# Patient Record
(demographics unavailable — no encounter records)

---

## 2024-12-10 NOTE — DISCUSSION/SUMMARY
[Reviewed Clinical Lab Test(s)] : Results of clinical tests were reviewed. [Reviewed Radiology Report(s)] : Radiology reports were reviewed. [Discuss Alternatives/Risks/Benefits w/Patient] : All alternatives, risks, and benefits were discussed with the patient/family and all questions were answered.  Patient expressed good understanding and appreciates the importance of follow up as recommended. [FreeTextEntry1] : ASC-H HRHPV+ with HPV 16/18/45 negative  []please see diagram with full explanation []bc her prior pap was also abnormal: will repeat pap only in 6 mos []HPV 9 valent recommended [] [facility] supplement [] TVUS bc could not locate [facility]

## 2024-12-10 NOTE — PHYSICAL EXAM
[Normal] : Anus and perineum: Normal sphincter tone, no masses, no prolapse. [Fully active, able to carry on all pre-disease performance without restriction] : Status 0 - Fully active, able to carry on all pre-disease performance without restriction [de-identified] : could not visualize IUD

## 2024-12-10 NOTE — HISTORY OF PRESENT ILLNESS
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   GYN Hx; no gyn issues, uses Kylina IUD (last on placed 2 years ago), had previous one dislodged.  had an abnormal pap last year also. Gyn is Ana Chadwick MD at [facility] in [place].  HPV vaccine 10 years ago. never OCP. never G/C/H Ob hx: no pregnancies Med History: none PFHx: MGF dx and  of stage 4 pancreas cancer

## 2024-12-10 NOTE — PHYSICAL EXAM
[Normal] : Anus and perineum: Normal sphincter tone, no masses, no prolapse. [Fully active, able to carry on all pre-disease performance without restriction] : Status 0 - Fully active, able to carry on all pre-disease performance without restriction [de-identified] : could not visualize IUD

## 2024-12-10 NOTE — HISTORY OF PRESENT ILLNESS
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   GYN Hx; no gyn issues, uses Kylina IUD (last on placed 2 years ago), had previous one dislodged.  had an abnormal pap last year also. Gyn is Ana Chadwick MD at [facility] in [place].  HPV vaccine 10 years ago. never OCP. never G/C/H Ob hx: no pregnancies Med History: none PFHx: MGF dx and  of stage 4 pancreas cancer

## 2024-12-10 NOTE — DISCUSSION/SUMMARY
[Reviewed Clinical Lab Test(s)] : Results of clinical tests were reviewed. [Reviewed Radiology Report(s)] : Radiology reports were reviewed. [Discuss Alternatives/Risks/Benefits w/Patient] : All alternatives, risks, and benefits were discussed with the patient/family and all questions were answered.  Patient expressed good understanding and appreciates the importance of follow up as recommended. [FreeTextEntry1] : ASC-H HRHPV+ with HPV 16/18/45 negative  []please see diagram with full explanation []bc her prior pap was also abnormal: will repeat pap only in 6 mos []HPV 9 valent recommended [] [facility] supplement [] TVUS bc could not locate [facility]

## 2025-07-01 NOTE — DISCUSSION/SUMMARY
[Reviewed Clinical Lab Test(s)] : Results of clinical tests were reviewed. [Reviewed Radiology Report(s)] : Radiology reports were reviewed. [Discuss Alternatives/Risks/Benefits w/Patient] : All alternatives, risks, and benefits were discussed with the patient/family and all questions were answered.  Patient expressed good understanding and appreciates the importance of follow up as recommended. [FreeTextEntry1] : ASC-H HRHPV+ with HPV 16/18/45 negative

## 2025-07-01 NOTE — PHYSICAL EXAM
[Normal] : Anus and perineum: Normal sphincter tone, no masses, no prolapse. [Fully active, able to carry on all pre-disease performance without restriction] : Status 0 - Fully active, able to carry on all pre-disease performance without restriction [de-identified] : could not visualize IUD

## 2025-07-01 NOTE — PHYSICAL EXAM
[Normal] : Anus and perineum: Normal sphincter tone, no masses, no prolapse. [Fully active, able to carry on all pre-disease performance without restriction] : Status 0 - Fully active, able to carry on all pre-disease performance without restriction [de-identified] : could not visualize IUD

## 2025-07-01 NOTE — HISTORY OF PRESENT ILLNESS
[FreeTextEntry1] : Problem 1) ASCUS-H  Previous Therapy 1) Pap ASC-H, HPV+ 16/18/45 neg. 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 IUD in proper position.   Here for repeat pap.   GYN Hx; no gyn issues, uses Kylina IUD (last on placed 2 years ago), had previous one dislodged.  had an abnormal pap last year also. Gyn is Ana Chadwick MD at [facility] in [place].  HPV vaccine 10 years ago. never OCP. never G/C/H Ob hx: no pregnancies Med History: none PFHx: MGF dx and  of stage 4 pancreas cancer

## 2025-07-01 NOTE — HISTORY OF PRESENT ILLNESS
[FreeTextEntry1] : Problem 1) ASCUS-H  Previous Therapy 1) Pap ASC-H, HPV+ 16/18/45 neg. 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 IUD in proper position.   Here for repeat pap.   GYN Hx; no gyn issues, uses Kylina IUD (last on placed 2 years ago), had previous one dislodged.  had an abnormal pap last year also. Gyn is Ana Chadwick MD at [facility] in [place].  HPV vaccine 10 years ago. never OCP. never G/C/H Ob hx: no pregnancies Med History: none PFHx: MGF dx and  of stage 4 pancreas cancer